# Patient Record
Sex: FEMALE | Race: WHITE | Employment: UNEMPLOYED | URBAN - METROPOLITAN AREA
[De-identification: names, ages, dates, MRNs, and addresses within clinical notes are randomized per-mention and may not be internally consistent; named-entity substitution may affect disease eponyms.]

---

## 2017-11-13 PROBLEM — M72.2 PLANTAR FASCIITIS: Status: ACTIVE | Noted: 2017-11-13

## 2018-04-03 ENCOUNTER — OFFICE VISIT (OUTPATIENT)
Dept: SURGERY | Facility: CLINIC | Age: 48
End: 2018-04-03

## 2018-04-03 VITALS
WEIGHT: 196 LBS | HEIGHT: 62 IN | SYSTOLIC BLOOD PRESSURE: 113 MMHG | BODY MASS INDEX: 36.07 KG/M2 | TEMPERATURE: 98 F | DIASTOLIC BLOOD PRESSURE: 78 MMHG | OXYGEN SATURATION: 98 % | HEART RATE: 98 BPM | RESPIRATION RATE: 18 BRPM

## 2018-04-03 DIAGNOSIS — D24.2 BREAST FIBROADENOMA IN FEMALE, LEFT: Primary | ICD-10-CM

## 2018-04-03 PROCEDURE — 99245 OFF/OP CONSLTJ NEW/EST HI 55: CPT | Performed by: SURGERY

## 2018-04-03 RX ORDER — SERTRALINE HYDROCHLORIDE 25 MG/1
TABLET, FILM COATED ORAL
Refills: 3 | COMMUNITY
Start: 2018-02-21 | End: 2018-04-03 | Stop reason: DRUGHIGH

## 2018-04-03 NOTE — PROGRESS NOTES
Education Record    Learner:  Patient    Disease / Diagnosis:surgical instructions    Barriers / Limitations:  None   Comments:    Method:  Discussion and Printed material   Comments:    General Topics:  Procedure and Plan of care reviewed   Comments:    O

## 2018-04-05 ENCOUNTER — TELEPHONE (OUTPATIENT)
Dept: SURGERY | Facility: CLINIC | Age: 48
End: 2018-04-05

## 2018-04-05 NOTE — PROGRESS NOTES
Breast Surgery New Patient Consultation    This is the first visit for this 49year old woman, referred by Mehreen Brush, who presents for evaluation of left breast mass.     History of Present Illness:   Ms. Terrell Hernandez is a 49year old woman who pre here today for evaluation and recommendations for further therapy.         Past Medical History:   Diagnosis Date   • Depression    • PULMONARY EMBOLISM    • Seizures ([facility] [address] 2009   • STROKE        Past Surgical History:  No date:   2008: OTHER exertion, emphysema, chronic bronchitis, shortness of breath or abnormal sound when breathing. Cardiovascular:   There is no history of chest pain, chest pressure/discomfort, palpitations, irregular heartbeat, fainting or near-fainting, difficulty breat Left arm, Patient Position: Sitting, Cuff Size: adult)   Pulse 98   Temp 98.1 °F (36.7 °C) (Tympanic)   Resp 18   Ht 1.575 m (5' 2\")   Wt 88.9 kg (196 lb)   SpO2 98%   BMI 35.85 kg/m²     Physical Exam:  The patient is an alert, oriented, well-nourished a history of breast cancer and an enlarging left breast mass. Discussion and Plan:  I had a discussion with the Patient regarding her breast exam. On exam today I found no palpable masses or clinical evidence of malignancy bilaterally.   I personally revsushil

## 2018-04-09 ENCOUNTER — TELEPHONE (OUTPATIENT)
Dept: SURGERY | Facility: CLINIC | Age: 48
End: 2018-04-09

## 2018-04-09 NOTE — TELEPHONE ENCOUNTER
Pt returning my phone call to schedule surgery. Pt asked for a date at the end of the month at either location. Scheduling surgery at [facility] on 4/30.

## 2018-04-24 RX ORDER — LORATADINE 10 MG/1
10 TABLET ORAL DAILY
COMMUNITY
End: 2019-04-09 | Stop reason: ALTCHOICE

## 2018-04-24 RX ORDER — IBUPROFEN 200 MG
200 TABLET ORAL EVERY 6 HOURS PRN
COMMUNITY
End: 2021-06-29 | Stop reason: ALTCHOICE

## 2018-04-24 RX ORDER — PHENYTOIN SODIUM 100 MG/1
200 CAPSULE, EXTENDED RELEASE ORAL DAILY
COMMUNITY
End: 2018-05-07

## 2018-04-24 RX ORDER — PHENYTOIN SODIUM 100 MG/1
300 CAPSULE, EXTENDED RELEASE ORAL NIGHTLY
COMMUNITY
End: 2018-05-07

## 2018-04-30 ENCOUNTER — ANESTHESIA (OUTPATIENT)
Dept: SURGERY | Facility: HOSPITAL | Age: 48
End: 2018-04-30
Payer: MEDICARE

## 2018-04-30 ENCOUNTER — APPOINTMENT (OUTPATIENT)
Dept: MAMMOGRAPHY | Facility: HOSPITAL | Age: 48
End: 2018-04-30
Attending: SURGERY
Payer: MEDICARE

## 2018-04-30 ENCOUNTER — HOSPITAL ENCOUNTER (OUTPATIENT)
Facility: HOSPITAL | Age: 48
Setting detail: HOSPITAL OUTPATIENT SURGERY
Discharge: HOME | End: 2018-04-30
Attending: SURGERY | Admitting: SURGERY
Payer: MEDICARE

## 2018-04-30 ENCOUNTER — HOSPITAL ENCOUNTER (OUTPATIENT)
Dept: MAMMOGRAPHY | Facility: HOSPITAL | Age: 48
Discharge: HOME | End: 2018-04-30
Attending: SURGERY
Payer: COMMERCIAL

## 2018-04-30 ENCOUNTER — HOSPITAL ENCOUNTER (OUTPATIENT)
Dept: ULTRASOUND IMAGING | Facility: HOSPITAL | Age: 48
Discharge: HOME | End: 2018-04-30
Attending: SURGERY
Payer: COMMERCIAL

## 2018-04-30 ENCOUNTER — ANESTHESIA EVENT (OUTPATIENT)
Dept: SURGERY | Facility: HOSPITAL | Age: 48
End: 2018-04-30
Payer: MEDICARE

## 2018-04-30 ENCOUNTER — SURGERY (OUTPATIENT)
Age: 48
End: 2018-04-30

## 2018-04-30 VITALS — HEART RATE: 87 BPM | RESPIRATION RATE: 16 BRPM | DIASTOLIC BLOOD PRESSURE: 87 MMHG | SYSTOLIC BLOOD PRESSURE: 123 MMHG

## 2018-04-30 VITALS
BODY MASS INDEX: 37.01 KG/M2 | TEMPERATURE: 98 F | DIASTOLIC BLOOD PRESSURE: 60 MMHG | OXYGEN SATURATION: 100 % | SYSTOLIC BLOOD PRESSURE: 95 MMHG | RESPIRATION RATE: 18 BRPM | HEIGHT: 62 IN | WEIGHT: 201.13 LBS | HEART RATE: 67 BPM

## 2018-04-30 DIAGNOSIS — D24.2 BREAST FIBROADENOMA, LEFT: ICD-10-CM

## 2018-04-30 PROCEDURE — 19285 PERQ DEV BREAST 1ST US IMAG: CPT | Performed by: SURGERY

## 2018-04-30 PROCEDURE — 76098 X-RAY EXAM SURGICAL SPECIMEN: CPT | Performed by: SURGERY

## 2018-04-30 PROCEDURE — 88307 TISSUE EXAM BY PATHOLOGIST: CPT | Performed by: SURGERY

## 2018-04-30 PROCEDURE — 0HBU0ZX EXCISION OF LEFT BREAST, OPEN APPROACH, DIAGNOSTIC: ICD-10-PCS | Performed by: SURGERY

## 2018-04-30 PROCEDURE — 77065 DX MAMMO INCL CAD UNI: CPT | Performed by: SURGERY

## 2018-04-30 PROCEDURE — BH01ZZZ PLAIN RADIOGRAPHY OF LEFT BREAST: ICD-10-PCS | Performed by: SURGERY

## 2018-04-30 PROCEDURE — 81025 URINE PREGNANCY TEST: CPT

## 2018-04-30 RX ORDER — METOCLOPRAMIDE 10 MG/1
10 TABLET ORAL ONCE
Status: DISCONTINUED | OUTPATIENT
Start: 2018-04-30 | End: 2018-04-30 | Stop reason: HOSPADM

## 2018-04-30 RX ORDER — HYDROCODONE BITARTRATE AND ACETAMINOPHEN 5; 325 MG/1; MG/1
2 TABLET ORAL AS NEEDED
Status: DISCONTINUED | OUTPATIENT
Start: 2018-04-30 | End: 2018-04-30

## 2018-04-30 RX ORDER — FAMOTIDINE 20 MG/1
20 TABLET ORAL ONCE
Status: COMPLETED | OUTPATIENT
Start: 2018-04-30 | End: 2018-04-30

## 2018-04-30 RX ORDER — HYDROMORPHONE HYDROCHLORIDE 1 MG/ML
0.2 INJECTION, SOLUTION INTRAMUSCULAR; INTRAVENOUS; SUBCUTANEOUS EVERY 5 MIN PRN
Status: DISCONTINUED | OUTPATIENT
Start: 2018-04-30 | End: 2018-04-30

## 2018-04-30 RX ORDER — HYDROCODONE BITARTRATE AND ACETAMINOPHEN 5; 325 MG/1; MG/1
1 TABLET ORAL AS NEEDED
Status: DISCONTINUED | OUTPATIENT
Start: 2018-04-30 | End: 2018-04-30

## 2018-04-30 RX ORDER — HYDROCODONE BITARTRATE AND ACETAMINOPHEN 5; 325 MG/1; MG/1
1-2 TABLET ORAL EVERY 6 HOURS PRN
Qty: 25 TABLET | Refills: 0 | Status: SHIPPED | OUTPATIENT
Start: 2018-04-30 | End: 2019-04-09 | Stop reason: ALTCHOICE

## 2018-04-30 RX ORDER — HYDROMORPHONE HYDROCHLORIDE 1 MG/ML
0.4 INJECTION, SOLUTION INTRAMUSCULAR; INTRAVENOUS; SUBCUTANEOUS EVERY 5 MIN PRN
Status: DISCONTINUED | OUTPATIENT
Start: 2018-04-30 | End: 2018-04-30

## 2018-04-30 RX ORDER — MIDAZOLAM HYDROCHLORIDE 1 MG/ML
INJECTION INTRAMUSCULAR; INTRAVENOUS AS NEEDED
Status: DISCONTINUED | OUTPATIENT
Start: 2018-04-30 | End: 2018-04-30 | Stop reason: SURG

## 2018-04-30 RX ORDER — HYDROMORPHONE HYDROCHLORIDE 1 MG/ML
0.6 INJECTION, SOLUTION INTRAMUSCULAR; INTRAVENOUS; SUBCUTANEOUS EVERY 5 MIN PRN
Status: DISCONTINUED | OUTPATIENT
Start: 2018-04-30 | End: 2018-04-30

## 2018-04-30 RX ORDER — ACETAMINOPHEN 500 MG
1000 TABLET ORAL ONCE
Status: COMPLETED | OUTPATIENT
Start: 2018-04-30 | End: 2018-04-30

## 2018-04-30 RX ORDER — NALOXONE HYDROCHLORIDE 0.4 MG/ML
80 INJECTION, SOLUTION INTRAMUSCULAR; INTRAVENOUS; SUBCUTANEOUS AS NEEDED
Status: DISCONTINUED | OUTPATIENT
Start: 2018-04-30 | End: 2018-04-30

## 2018-04-30 RX ORDER — CEFAZOLIN SODIUM 1 G/3ML
INJECTION, POWDER, FOR SOLUTION INTRAMUSCULAR; INTRAVENOUS AS NEEDED
Status: DISCONTINUED | OUTPATIENT
Start: 2018-04-30 | End: 2018-04-30 | Stop reason: SURG

## 2018-04-30 RX ORDER — SODIUM CHLORIDE, SODIUM LACTATE, POTASSIUM CHLORIDE, CALCIUM CHLORIDE 600; 310; 30; 20 MG/100ML; MG/100ML; MG/100ML; MG/100ML
INJECTION, SOLUTION INTRAVENOUS CONTINUOUS
Status: DISCONTINUED | OUTPATIENT
Start: 2018-04-30 | End: 2018-04-30

## 2018-04-30 RX ORDER — LIDOCAINE HYDROCHLORIDE 10 MG/ML
INJECTION, SOLUTION EPIDURAL; INFILTRATION; INTRACAUDAL; PERINEURAL AS NEEDED
Status: DISCONTINUED | OUTPATIENT
Start: 2018-04-30 | End: 2018-04-30 | Stop reason: SURG

## 2018-04-30 RX ORDER — ONDANSETRON 2 MG/ML
4 INJECTION INTRAMUSCULAR; INTRAVENOUS ONCE AS NEEDED
Status: DISCONTINUED | OUTPATIENT
Start: 2018-04-30 | End: 2018-04-30

## 2018-04-30 RX ADMIN — CEFAZOLIN SODIUM 1 G: 1 INJECTION, POWDER, FOR SOLUTION INTRAMUSCULAR; INTRAVENOUS at 10:32:00

## 2018-04-30 RX ADMIN — LIDOCAINE HYDROCHLORIDE 25 MG: 10 INJECTION, SOLUTION EPIDURAL; INFILTRATION; INTRACAUDAL; PERINEURAL at 10:34:00

## 2018-04-30 RX ADMIN — SODIUM CHLORIDE, SODIUM LACTATE, POTASSIUM CHLORIDE, CALCIUM CHLORIDE: 600; 310; 30; 20 INJECTION, SOLUTION INTRAVENOUS at 11:04:00

## 2018-04-30 RX ADMIN — SODIUM CHLORIDE, SODIUM LACTATE, POTASSIUM CHLORIDE, CALCIUM CHLORIDE: 600; 310; 30; 20 INJECTION, SOLUTION INTRAVENOUS at 10:28:00

## 2018-04-30 RX ADMIN — MIDAZOLAM HYDROCHLORIDE 2 MG: 1 INJECTION INTRAMUSCULAR; INTRAVENOUS at 10:28:00

## 2018-04-30 NOTE — PROCEDURES
[facility] HOSP - [facility]  Procedure Note    Abida Forts Patient Status:  Outpatient    1970 MRN [id number]   Location [place] [address] Attending Kiki Toth MD   Hosp Day # 0 PCP TARUN LOTT     Procedure: ultrasound g

## 2018-04-30 NOTE — ANESTHESIA POSTPROCEDURE EVALUATION
Patient: Carlos Subhash    Procedure Summary     Date:  04/30/18 Room / Location:  [facility] OR 08 / [facility] OR    Anesthesia Start:  8301 Anesthesia Stop:      Procedure:  BREAST BIOPSY NEEDLE LOCALIZATION (Left ) Diagnosis:  (left breast fibroadenoma)

## 2018-04-30 NOTE — IMAGING NOTE
PT TO US DEPT SCOUTS COMPLETED  AT 0830    HX TAKEN PROCEDURE EXPLAINED ORDERED VERFIED AND INITIALED BY ALL STAFF MEMBERS CONSENT OBTAINED AT 0815      [facility] HERE SCANNING COMPLETED     LEFT BREAST 1:00/9CFN SITE MARKED [phone] CHLOROPREP  AS Jeremy

## 2018-04-30 NOTE — OPERATIVE REPORT
[facility]    PATIENT'S NAME: Ramon Milton   ATTENDING PHYSICIAN: Thania Cordoba. Nicolás Fox MD   OPERATING PHYSICIAN: Thania Cordoba.  Nicolás Fox MD   PATIENT ACCOUNT#:   [id number]    LOCATION:  [facility] 10  MEDICAL RECORD #:   [id number] left breast was prepped and draped in usual sterile fashion. Lidocaine  2% with epinephrine was used to infiltrate the skin and subcutaneous tissues at the incision site. A radial axillary incision was made with a 15 blade knife in the skin.   The wire wa

## 2018-04-30 NOTE — H&P
History of Present Illness:   Ms. Zain Sánchez is a 49year old woman who presents with imaging detected bilateral breast concerns.   The patient has a personal history of a right breast needle biopsy followed by excisional biopsy in 2013 for what was [facility]) 2009   • STROKE           Past Surgical History:  No date:   2008: OTHER      Comment: IVC filter     Gynecological History:  Pt is a   Pt was 44years old at time of first pregnancy.     She has cumulative breastfeeding history of brief of chest pain, chest pressure/discomfort, palpitations, irregular heartbeat, fainting or near-fainting, difficulty breathing when lying flat, SOB/Coughing at night, swelling of the legs or chest pain while walking.     Breasts:  See history of present illn m (5' 2\")   Wt 88.9 kg (196 lb)   SpO2 98%   BMI 35.85 kg/m²      Physical Exam:  The patient is an alert, oriented, well-nourished and  well-developed woman who appears her stated age.  Her speech patterns and movements are normal. Her affect is appropria Patient regarding her breast exam. On exam today I found no palpable masses or clinical evidence of malignancy bilaterally.   I personally reviewed her recent imaging and pathology we discussed this at length.     We did discuss that fibroadenomas are not t planned.

## 2018-04-30 NOTE — BRIEF OP NOTE
Pre-Operative Diagnosis: left breast fibroadenoma     Post-Operative Diagnosis: left breast fibroadenoma      Procedure Performed:   Procedure(s):  left breast wire localized excisional biopsy    Surgeon(s) and Role:     Randal Talavera MD - Primary

## 2018-04-30 NOTE — ANESTHESIA PREPROCEDURE EVALUATION
Anesthesia PreOp Note    HPI:     Darleen Stafford is a 49year old female who presents for preoperative consultation requested by: Kadeem Mahoney MD    Date of Surgery: 4/30/2018    Procedure(s):  BREAST BIOPSY NEEDLE LOCALIZATION  Indication: left b Social History  Social History   Marital status:   Spouse name: N/A    Years of education: N/A  Number of children: N/A     Occupational History  None on file     Social History Main Topics   Smoking status: Former Smoker  For 2.00 Years     Q RITESH  4/30/2018 10:16 AM

## 2018-05-07 RX ORDER — PHENYTOIN SODIUM 100 MG/1
CAPSULE, EXTENDED RELEASE ORAL
COMMUNITY
Start: 2018-04-05 | End: 2021-06-30

## 2018-05-08 ENCOUNTER — OFFICE VISIT (OUTPATIENT)
Dept: SURGERY | Facility: CLINIC | Age: 48
End: 2018-05-08

## 2018-05-08 VITALS
TEMPERATURE: 99 F | HEART RATE: 85 BPM | SYSTOLIC BLOOD PRESSURE: 119 MMHG | RESPIRATION RATE: 20 BRPM | WEIGHT: 199.63 LBS | BODY MASS INDEX: 37 KG/M2 | DIASTOLIC BLOOD PRESSURE: 77 MMHG

## 2018-05-08 DIAGNOSIS — N63.20 LEFT BREAST MASS: ICD-10-CM

## 2018-05-08 DIAGNOSIS — R92.8 ABNORMAL MAMMOGRAM OF LEFT BREAST: ICD-10-CM

## 2018-05-08 DIAGNOSIS — D24.2 BREAST FIBROADENOMA IN FEMALE, LEFT: Primary | ICD-10-CM

## 2018-05-08 PROCEDURE — 99024 POSTOP FOLLOW-UP VISIT: CPT | Performed by: SURGERY

## 2018-05-08 NOTE — PROGRESS NOTES
Feeling well after her procedure. Some skin irritation, possibly related to adhesive. Steri strips in place. No signs of infection.

## 2018-05-09 NOTE — PROGRESS NOTES
Breast Surgery Post-Operative Visit    Diagnosis: Left breast fibroadenoma status post surgical excision on April 30, 2018. Stage: N/A    Disease Status:  Surgical treatment complete, no further treatment pending. History:    This 49year old woman pr erythema or drainage from the incision. She is here today for evaluation and recommendations for further therapy.         Past Medical History:   Diagnosis Date   • Deep vein thrombosis ([address]    • Depression    • PULMONARY EMBOLISM    • Seizures ([facility] [address] 2009 pleurisy/chest pain, pneumonia, asthma, wheezing, difficulty in breathing with exertion, emphysema, chronic bronchitis, shortness of breath or abnormal sound when breathing. Cardiovascular:   There is no history of chest pain, chest pressure/discomfort, history of hives, hay fever, angioedema or anaphylaxis.     /77 (BP Location: Left arm, Patient Position: Sitting)   Pulse 85   Temp 98.6 °F (37 °C) (Tympanic)   Resp 20   Wt 90.5 kg (199 lb 9.6 oz)   LMP 04/18/2018   BMI 36.51 kg/m²     Physical Exam

## 2019-04-09 PROBLEM — R58 RETROPERITONEAL BLEED: Status: RESOLVED | Noted: 2018-09-27 | Resolved: 2019-04-09

## 2019-04-09 PROBLEM — I82.220 IVC THROMBOSIS (HCC): Status: RESOLVED | Noted: 2018-09-27 | Resolved: 2019-04-09

## 2019-04-09 PROBLEM — I69.398 SEIZURE AS LATE EFFECT OF CEREBROVASCULAR ACCIDENT (CVA) (HCC): Status: ACTIVE | Noted: 2019-04-09

## 2019-04-09 PROBLEM — I82.220 IVC THROMBOSIS (HCC): Status: ACTIVE | Noted: 2018-09-27

## 2019-04-09 PROBLEM — R58 RETROPERITONEAL BLEED: Status: ACTIVE | Noted: 2018-09-27

## 2019-04-09 PROBLEM — I63.50 CEREBRAL ARTERY OCCLUSION WITH CEREBRAL INFARCTION (HCC): Status: ACTIVE | Noted: 2019-04-09

## 2019-04-09 PROBLEM — R56.9 SEIZURE AS LATE EFFECT OF CEREBROVASCULAR ACCIDENT (CVA) (HCC): Status: ACTIVE | Noted: 2019-04-09

## 2019-04-09 PROBLEM — I80.201 DEEP VEIN THROMBOPHLEBITIS OF LEG, RIGHT (HCC): Status: ACTIVE | Noted: 2018-06-03

## 2019-04-09 PROBLEM — D68.52 PROTHROMBIN GENE MUTATION (HCC): Status: ACTIVE | Noted: 2019-04-09

## 2019-04-09 PROBLEM — I80.201 DEEP VEIN THROMBOPHLEBITIS OF LEG, RIGHT (HCC): Status: RESOLVED | Noted: 2018-06-03 | Resolved: 2019-04-09

## 2019-04-09 PROBLEM — I63.50 CEREBRAL ARTERY OCCLUSION WITH CEREBRAL INFARCTION (HCC): Status: RESOLVED | Noted: 2019-04-09 | Resolved: 2019-04-09

## 2019-04-09 PROBLEM — D68.59 PRIMARY HYPERCOAGULABLE STATE (HCC): Status: ACTIVE | Noted: 2019-04-09

## 2019-05-06 PROBLEM — I82.409 DVT (DEEP VENOUS THROMBOSIS) (HCC): Status: ACTIVE | Noted: 2019-05-06

## 2019-12-03 PROBLEM — I82.502: Status: ACTIVE | Noted: 2019-12-03

## 2019-12-03 PROBLEM — Z79.01 MONITORING FOR ANTICOAGULANT USE: Status: ACTIVE | Noted: 2019-12-03

## 2019-12-03 PROBLEM — Z51.81 MONITORING FOR ANTICOAGULANT USE: Status: ACTIVE | Noted: 2019-12-03

## 2020-09-21 ENCOUNTER — OFF PREMISE (OUTPATIENT)
Dept: PULMONOLOGY | Age: 50
End: 2020-09-21

## 2020-09-21 DIAGNOSIS — G47.33 OBSTRUCTIVE SLEEP APNEA (ADULT) (PEDIATRIC): Primary | ICD-10-CM

## 2020-09-21 PROCEDURE — 95810 POLYSOM 6/> YRS 4/> PARAM: CPT | Performed by: INTERNAL MEDICINE

## 2020-10-13 ENCOUNTER — OFF PREMISE (OUTPATIENT)
Dept: PULMONOLOGY | Age: 50
End: 2020-10-13

## 2020-10-13 DIAGNOSIS — G47.33 OBSTRUCTIVE SLEEP APNEA (ADULT) (PEDIATRIC): Primary | ICD-10-CM

## 2020-10-13 PROCEDURE — 95811 POLYSOM 6/>YRS CPAP 4/> PARM: CPT | Performed by: INTERNAL MEDICINE

## 2021-03-23 ENCOUNTER — APPOINTMENT (OUTPATIENT)
Dept: PULMONOLOGY | Age: 51
End: 2021-03-23

## 2021-06-29 PROBLEM — Z86.72 HISTORY OF DEEP VEIN THROMBOPHLEBITIS OF LOWER EXTREMITY: Status: ACTIVE | Noted: 2019-12-03

## 2021-06-29 PROBLEM — G47.33 OBSTRUCTIVE SLEEP APNEA SYNDROME: Status: ACTIVE | Noted: 2020-10-13

## 2021-06-29 PROBLEM — Z51.81 MONITORING FOR ANTICOAGULANT USE: Status: RESOLVED | Noted: 2019-12-03 | Resolved: 2021-06-29

## 2021-06-29 PROBLEM — I82.409 DVT (DEEP VENOUS THROMBOSIS) (HCC): Status: RESOLVED | Noted: 2019-05-06 | Resolved: 2021-06-29

## 2021-06-29 PROBLEM — Z79.01 MONITORING FOR ANTICOAGULANT USE: Status: RESOLVED | Noted: 2019-12-03 | Resolved: 2021-06-29

## 2021-06-29 PROBLEM — G81.94 LEFT HEMIPARESIS (HCC): Status: ACTIVE | Noted: 2021-05-25

## 2021-07-27 ENCOUNTER — HOSPITAL ENCOUNTER (EMERGENCY)
Age: 51
Discharge: HOME | End: 2021-07-27
Payer: MEDICARE

## 2021-07-27 ENCOUNTER — APPOINTMENT (OUTPATIENT)
Dept: ULTRASOUND IMAGING | Age: 51
End: 2021-07-27
Payer: MEDICARE

## 2021-07-27 ENCOUNTER — HOSPITAL ENCOUNTER (OUTPATIENT)
Age: 51
Discharge: ANOTHER HOSPITAL | End: 2021-07-27
Attending: EMERGENCY MEDICINE
Payer: MEDICARE

## 2021-07-27 VITALS
RESPIRATION RATE: 16 BRPM | SYSTOLIC BLOOD PRESSURE: 107 MMHG | DIASTOLIC BLOOD PRESSURE: 56 MMHG | TEMPERATURE: 98 F | HEIGHT: 62 IN | WEIGHT: 230 LBS | OXYGEN SATURATION: 98 % | HEART RATE: 72 BPM | BODY MASS INDEX: 42.33 KG/M2

## 2021-07-27 VITALS
HEART RATE: 82 BPM | DIASTOLIC BLOOD PRESSURE: 79 MMHG | RESPIRATION RATE: 20 BRPM | SYSTOLIC BLOOD PRESSURE: 137 MMHG | OXYGEN SATURATION: 98 % | TEMPERATURE: 98 F

## 2021-07-27 DIAGNOSIS — M79.602 LEFT ARM PAIN: Primary | ICD-10-CM

## 2021-07-27 DIAGNOSIS — M79.89 LEFT ARM SWELLING: Primary | ICD-10-CM

## 2021-07-27 PROCEDURE — 99284 EMERGENCY DEPT VISIT MOD MDM: CPT

## 2021-07-27 PROCEDURE — 93971 EXTREMITY STUDY: CPT

## 2021-07-27 PROCEDURE — 99212 OFFICE O/P EST SF 10 MIN: CPT

## 2021-07-28 NOTE — ED PROVIDER NOTES
[facility] [place]      History   Patient presents with:  Swelling Edema    Stated Complaint: sent from [place] to r/o DVT in l arm due to numbness    HPI/Subjective:   HPI    42-year-old female with history of This is a middle-aged female sitting on a gurney. Vital signs were reviewed per nurse's notes. Left upper extremity: Neurovascularly intact with full range of motion.   There is mild diffuse left upper arm soft tissue swelling without erythema or indurati [place] [address]  [phone]    Call  As needed          Medications Prescribed:  Discharge Medication List as of 7/27/2021 10:10 PM

## 2021-07-28 NOTE — ED QUICK NOTES
Pt c/o laid down on left side woke up feeling numb on the left arm laid on right and felt a lump on left arm. Pain on the left forearm. Pt states whole body aching.

## 2021-07-28 NOTE — ED INITIAL ASSESSMENT (HPI)
Noticed bump to l upper arm this afternoon - had noticed tingling to arm today-- hx of DVTS--  Denies SOB

## 2021-07-28 NOTE — ED PROVIDER NOTES
Patient Seen in: [facility]      History   Patient presents with:  Pain    Stated Complaint: left arm numb all day pain in wrist and noticed bump on arm/states has history *    HPI/Subjective:   HPI    Patient is a 16-year-old female with Constitutional:       General: She is not in acute distress. Appearance: She is well-developed. She is not toxic-appearing. HENT:      Head: Normocephalic and atraumatic. Eyes:      General: No scleral icterus.      Conjunctiva/sclera: Conjunctiva

## 2021-07-28 NOTE — ED INITIAL ASSESSMENT (HPI)
Pt here w/ c/o numbness to left arm onset at noon today. States she noticed a lump on left arm today.

## 2023-06-12 ENCOUNTER — NURSE NAVIGATOR ENCOUNTER (OUTPATIENT)
Dept: HEMATOLOGY/ONCOLOGY | Facility: HOSPITAL | Age: 53
End: 2023-06-12

## 2023-06-12 NOTE — PROGRESS NOTES
Patient called back in regards to the VM. We discussed newly diagnosed breast cancer. Introduced myself as one of the breast nurse navigators and explained the role of the breast nurse navigator. Explained the role of the physicians on her breast cancer care team. Reviewed over pathology report and discussed the type of breast cancer, grade, and ER/DC and Her 2 is pending. Briefly discussed breast cancer treatments including surgery, radiation, hormone therapy, and chemotherapy. I stated that since her cancer is also in her lymph node, I offered an appointment with Dr. Fanta iKm on Tuesday June 20, 2023 at 1500 at the [facility] and she accepted. Explained the breast cancer multidisciplinary meeting and that her case will be discussed. Scheduled patient's Dr. Zunilda De appointment on Tuesday June 20, 2023 at 0900 in [place]. Patient is scheduled for a breast MRI at [facility] tomorrow, Tuesday June 13, 2023. Patient instructed patient to bring 5 years of her breast images and records to Dr. Meka Alejandre office. She verbalized understanding. Patient given my contact information to call with any further questions or concerns.

## 2023-06-12 NOTE — PROGRESS NOTES
LMOVMTCB in regards to introducing myself as one of [address] [place]'s breast nurse navigators and that I work with Dr. Meg Humphrey. Awaiting phone call back from patient.

## 2023-06-20 ENCOUNTER — OFFICE VISIT (OUTPATIENT)
Dept: SURGERY | Facility: CLINIC | Age: 53
End: 2023-06-20
Payer: MEDICARE

## 2023-06-20 ENCOUNTER — NURSE NAVIGATOR ENCOUNTER (OUTPATIENT)
Dept: HEMATOLOGY/ONCOLOGY | Facility: HOSPITAL | Age: 53
End: 2023-06-20

## 2023-06-20 ENCOUNTER — OFFICE VISIT (OUTPATIENT)
Dept: HEMATOLOGY/ONCOLOGY | Facility: HOSPITAL | Age: 53
End: 2023-06-20
Attending: INTERNAL MEDICINE
Payer: MEDICARE

## 2023-06-20 VITALS
OXYGEN SATURATION: 97 % | WEIGHT: 209 LBS | HEART RATE: 76 BPM | HEIGHT: 62.01 IN | TEMPERATURE: 98 F | RESPIRATION RATE: 16 BRPM | BODY MASS INDEX: 37.97 KG/M2 | DIASTOLIC BLOOD PRESSURE: 76 MMHG | SYSTOLIC BLOOD PRESSURE: 123 MMHG

## 2023-06-20 VITALS
SYSTOLIC BLOOD PRESSURE: 122 MMHG | HEIGHT: 62 IN | RESPIRATION RATE: 16 BRPM | OXYGEN SATURATION: 97 % | BODY MASS INDEX: 39.34 KG/M2 | HEART RATE: 90 BPM | DIASTOLIC BLOOD PRESSURE: 79 MMHG | TEMPERATURE: 98 F | WEIGHT: 213.81 LBS

## 2023-06-20 DIAGNOSIS — D68.52 PROTHROMBIN GENE MUTATION (HCC): ICD-10-CM

## 2023-06-20 DIAGNOSIS — D68.59 PRIMARY HYPERCOAGULABLE STATE (HCC): ICD-10-CM

## 2023-06-20 DIAGNOSIS — C77.3 BREAST CANCER METASTASIZED TO AXILLARY LYMPH NODE, LEFT (HCC): ICD-10-CM

## 2023-06-20 DIAGNOSIS — C50.912 BREAST CANCER METASTASIZED TO AXILLARY LYMPH NODE, LEFT (HCC): ICD-10-CM

## 2023-06-20 DIAGNOSIS — C50.912 BREAST CANCER METASTASIZED TO AXILLARY LYMPH NODE, LEFT (HCC): Primary | ICD-10-CM

## 2023-06-20 DIAGNOSIS — Z86.718 HISTORY OF VENOUS THROMBOEMBOLISM: ICD-10-CM

## 2023-06-20 DIAGNOSIS — G81.94 LEFT HEMIPARESIS (HCC): ICD-10-CM

## 2023-06-20 DIAGNOSIS — C77.3 BREAST CANCER METASTASIZED TO AXILLARY LYMPH NODE, LEFT (HCC): Primary | ICD-10-CM

## 2023-06-20 DIAGNOSIS — Z17.0 MALIGNANT NEOPLASM OF LOWER-OUTER QUADRANT OF LEFT BREAST OF FEMALE, ESTROGEN RECEPTOR POSITIVE (HCC): Primary | ICD-10-CM

## 2023-06-20 DIAGNOSIS — Z86.73 HISTORY OF STROKE: ICD-10-CM

## 2023-06-20 DIAGNOSIS — Z79.01 CURRENT USE OF LONG TERM ANTICOAGULATION: ICD-10-CM

## 2023-06-20 DIAGNOSIS — C50.512 MALIGNANT NEOPLASM OF LOWER-OUTER QUADRANT OF LEFT BREAST OF FEMALE, ESTROGEN RECEPTOR POSITIVE (HCC): Primary | ICD-10-CM

## 2023-06-20 PROCEDURE — 99205 OFFICE O/P NEW HI 60 MIN: CPT | Performed by: INTERNAL MEDICINE

## 2023-06-20 PROCEDURE — 99205 OFFICE O/P NEW HI 60 MIN: CPT | Performed by: SURGERY

## 2023-06-20 NOTE — PROGRESS NOTES
Met with patient in clinic. Introduced myself as one the breast nurse navigators and explained the role of the breast nurse navigator and coordination of care. Explained the role of all of the physicians involved in her care including the surgeon, medical oncologist, radiation oncologist, and possibly plastic surgeon. Reviewed over the patients pathology report and discussed receptors including ER/NE/ and Her 2. Patient was given the breast cancer treatment handbook and reviewed over how to use this resource. Discussed the breast multidisciplinary conference and that her case will be discussed. Patient was given the contact information for the social workers at the [facility] and resources for support as requested. Breast cancer journey map provided to patient. Sent requested information to patient on local resources including support groups for her daughter. Also provided information on the [place] for Reassurance through [facility]. Next step in care will be to see medical oncology this afternoon. Has CT scan on 6/30 and bone scan on 6/30. Pt was provided with breast nurse navigator contact information and was encouraged to phone with any other questions or concerns. Date Of Previous Biopsy (Optional): 09 November 2021

## 2023-06-20 NOTE — PATIENT INSTRUCTIONS
Dr. Doyle Bragg  Tel: [phone]  Fax: [facility] [address] [place], [address]  [phone]     Surgery/Procedure: Left breast wire bracketed lumpectomy, left axillary lymph node dissection    FINAL SURGICAL PLAN AFTER MET WORKUP     Anesthesia:   Gen  Surgery Length:   1.5 hours CPT:     Wire LOC:   Yes         Nuc Med:   No Cathy Seed:  No       Dx & ICD-10:    Radiology Instructions:    _______________________________________________________________________________    Someone must accompany you the day of the procedure to drive you home safely, because of anesthesia. You will need an adult  to stay with you the first night following your surgery. You must remove any kind of makeup, acrylic nails, lotions, powders, creams or deodorant. [place] ONLY: Pre-admission will give instruct you on when to take Gatorade and Tylenol/acetaminophen prior to your surgery, purchase 2 - 12oz bottles of regular Gatorade (NOT RED/SUGAR FREE). Otherwise, you may not eat or drink anything else after 11PM the night before surgery. [place] ONLY: You may not eat or drink anything after midnight the day of your surgery. Wear comfortable clothing that can be easily removed. If you wear dentures, contacts lenses, or any prosthesis, you will be asked to remove them. Do not drink alcohol or smoke 24 hours prior to your procedure. Bring a picture ID and your insurance card. GENERAL ANESTHESIA ONLY: You will be contacted by the hospital for Pre-Admission Covid-19 testing (regardless of vaccination status) to be scheduled as an appointment prior to surgery. They will call closer to the surgery date to set this up, because the earliest this can be done is 72 hours prior to surgery. The Pre-Admission Testing Department will call the day before to confirm your procedure, give you the time you need to arrive by and directions on where to go.  They begin making calls after 2pm, if you are not contacted by 4pm, please call the surgeon's office listed above. Do not take any blood thinners at least one week prior to the procedure/surgery. This includes aspirin, baby aspirin, Ibuprofen products, herbal supplements, diet medications, vitamin E, fish oil and green tea supplements. Please check other supplements for these ingredients. *TYLENOL or acetaminophen is acceptable*  If you take Coumadin, Plavix, Xarelto, or Eliquis, please contact your prescribing physician for special instructions on how long to hold. If you take insulin contact your primary care physician for special instructions. Our surgery scheduler, Guy Griffin, will be contacting you to discuss surgery dates. If you have any questions related to scheduling your surgery, please reach out to her at [phone].  _____________________________________________________________________  PRE-OPERATIVE TESTING IF INDICATED BELOW  PLEASE COMPLETE ASAP (AT LEAST 14-21 DAYS PRIOR TO SURGERY)  [] CBC [x] BMP [] CMP [x] EKG    [] PT, PTT, INR [x] Cardiac Clearance  [] H&P Medical Clearance [] Chest X-ray     Please call Central Scheduling to schedule an appointment for pre-operative labs/tests @ ([phone]    Does the patient have a pacemaker or ICD?      [] Yes   [x] No

## 2023-06-21 ENCOUNTER — NURSE NAVIGATOR ENCOUNTER (OUTPATIENT)
Dept: HEMATOLOGY/ONCOLOGY | Facility: HOSPITAL | Age: 53
End: 2023-06-21

## 2023-06-23 ENCOUNTER — APPOINTMENT (OUTPATIENT)
Dept: HEMATOLOGY/ONCOLOGY | Facility: HOSPITAL | Age: 53
End: 2023-06-23
Attending: INTERNAL MEDICINE

## 2023-06-23 ENCOUNTER — HOSPITAL ENCOUNTER (OUTPATIENT)
Dept: CT IMAGING | Facility: HOSPITAL | Age: 53
Discharge: HOME | End: 2023-06-23
Attending: INTERNAL MEDICINE
Payer: MEDICARE

## 2023-06-23 DIAGNOSIS — C50.912 BREAST CANCER METASTASIZED TO AXILLARY LYMPH NODE, LEFT (HCC): ICD-10-CM

## 2023-06-23 DIAGNOSIS — C77.3 BREAST CANCER METASTASIZED TO AXILLARY LYMPH NODE, LEFT (HCC): ICD-10-CM

## 2023-06-23 LAB
CREAT BLD-MCNC: 0.8 MG/DL
GFR SERPLBLD BASED ON 1.73 SQ M-ARVRAT: 88 ML/MIN/1.73M2 (ref 60–?)

## 2023-06-23 PROCEDURE — 74177 CT ABD & PELVIS W/CONTRAST: CPT | Performed by: INTERNAL MEDICINE

## 2023-06-23 PROCEDURE — 82565 ASSAY OF CREATININE: CPT

## 2023-06-23 PROCEDURE — 71260 CT THORAX DX C+: CPT | Performed by: INTERNAL MEDICINE

## 2023-06-30 ENCOUNTER — HOSPITAL ENCOUNTER (OUTPATIENT)
Dept: NUCLEAR MEDICINE | Facility: HOSPITAL | Age: 53
Discharge: HOME | End: 2023-06-30
Attending: INTERNAL MEDICINE
Payer: MEDICARE

## 2023-06-30 ENCOUNTER — NURSE NAVIGATOR ENCOUNTER (OUTPATIENT)
Dept: HEMATOLOGY/ONCOLOGY | Facility: HOSPITAL | Age: 53
End: 2023-06-30

## 2023-06-30 DIAGNOSIS — C77.3 BREAST CANCER METASTASIZED TO AXILLARY LYMPH NODE, LEFT (HCC): ICD-10-CM

## 2023-06-30 DIAGNOSIS — C50.912 BREAST CANCER METASTASIZED TO AXILLARY LYMPH NODE, LEFT (HCC): ICD-10-CM

## 2023-06-30 PROCEDURE — 78306 BONE IMAGING WHOLE BODY: CPT | Performed by: INTERNAL MEDICINE

## 2023-06-30 PROCEDURE — 78832 RP LOCLZJ TUM SPECT W/CT 2: CPT | Performed by: INTERNAL MEDICINE

## 2023-07-03 ENCOUNTER — LAB ENCOUNTER (OUTPATIENT)
Dept: LAB | Facility: HOSPITAL | Age: 53
End: 2023-07-03
Attending: SURGERY
Payer: MEDICARE

## 2023-07-03 DIAGNOSIS — C50.912 INVASIVE DUCTAL CARCINOMA OF BREAST, FEMALE, LEFT (HCC): Primary | ICD-10-CM

## 2023-07-03 PROCEDURE — 88321 CONSLTJ&REPRT SLD PREP ELSWR: CPT

## 2023-07-06 ENCOUNTER — NURSE NAVIGATOR ENCOUNTER (OUTPATIENT)
Dept: HEMATOLOGY/ONCOLOGY | Facility: HOSPITAL | Age: 53
End: 2023-07-06

## 2023-07-06 ENCOUNTER — PATIENT MESSAGE (OUTPATIENT)
Dept: HEMATOLOGY/ONCOLOGY | Facility: HOSPITAL | Age: 53
End: 2023-07-06

## 2023-07-06 DIAGNOSIS — C77.3 BREAST CANCER METASTASIZED TO AXILLARY LYMPH NODE, LEFT (HCC): Primary | ICD-10-CM

## 2023-07-06 DIAGNOSIS — C50.912 BREAST CANCER METASTASIZED TO AXILLARY LYMPH NODE, LEFT (HCC): Primary | ICD-10-CM

## 2023-07-06 NOTE — PROGRESS NOTES
Called patient in regards to notifying her that I sent her a [employer] message with a list of [phone] PCPs that her  asked for when I saw him yesterday when I provided the list of lab work, EKG, and cardiac clearance that was provided by Dr. Nicole Police team for surgery clearance from her PCP. She thanked me and stated she may be seeing her PCP on Friday July 7th now for an appointment and she is pleased. She thanked me for the phone call and assistance and was provided with the breast nurse navigators contact information and was encouraged to phone with any other questions or concerns.

## 2023-07-07 ENCOUNTER — OFFICE VISIT (OUTPATIENT)
Dept: SURGERY | Facility: CLINIC | Age: 53
End: 2023-07-07
Payer: MEDICARE

## 2023-07-07 VITALS
RESPIRATION RATE: 19 BRPM | HEART RATE: 75 BPM | WEIGHT: 212 LBS | DIASTOLIC BLOOD PRESSURE: 70 MMHG | OXYGEN SATURATION: 96 % | BODY MASS INDEX: 39 KG/M2 | SYSTOLIC BLOOD PRESSURE: 107 MMHG | TEMPERATURE: 98 F

## 2023-07-07 DIAGNOSIS — C77.3 BREAST CANCER METASTASIZED TO AXILLARY LYMPH NODE, LEFT (HCC): Primary | ICD-10-CM

## 2023-07-07 DIAGNOSIS — C50.912 BREAST CANCER METASTASIZED TO AXILLARY LYMPH NODE, LEFT (HCC): Primary | ICD-10-CM

## 2023-07-10 NOTE — TELEPHONE ENCOUNTER
Calling pt in regards to scheduling surgery. Informed pt that I have 07/31/2023 but wanted 08/07/2023 with  starting new job at [employer] with Dr. Lauran Opitz. Pt verbalized understanding and in agreement with date and location. All questions answered. Encouraged pt to call or [employer] message office with any other questions or concerns.

## 2023-07-12 ENCOUNTER — TELEPHONE (OUTPATIENT)
Dept: SURGERY | Facility: CLINIC | Age: 53
End: 2023-07-12

## 2023-07-13 ENCOUNTER — TELEPHONE (OUTPATIENT)
Dept: SURGERY | Facility: CLINIC | Age: 53
End: 2023-07-13

## 2023-07-13 NOTE — TELEPHONE ENCOUNTER
Calling patient to offer sooner surgical date. Pt needs to hold coumadin for 5 days prior to surgery, so unable to take sooner surgical date. Pt stated if any other sooner surgical dates are available to please call her.

## 2023-07-20 ENCOUNTER — TELEPHONE (OUTPATIENT)
Dept: SURGERY | Facility: CLINIC | Age: 53
End: 2023-07-20

## 2023-07-20 DIAGNOSIS — C50.912 BREAST CANCER METASTASIZED TO AXILLARY LYMPH NODE, LEFT (HCC): Primary | ICD-10-CM

## 2023-07-20 DIAGNOSIS — C77.3 BREAST CANCER METASTASIZED TO AXILLARY LYMPH NODE, LEFT (HCC): Primary | ICD-10-CM

## 2023-07-20 NOTE — TELEPHONE ENCOUNTER
Patient called and stated she is transferring care and is cancelling her surgery with Dr. Holden Zayas.   Sent in basket to [facility]

## 2024-07-19 ENCOUNTER — HOSPITAL ENCOUNTER (EMERGENCY)
Age: 54
Discharge: HOME | End: 2024-07-19
Attending: EMERGENCY MEDICINE
Payer: COMMERCIAL

## 2024-07-19 VITALS
BODY MASS INDEX: 42.33 KG/M2 | RESPIRATION RATE: 16 BRPM | TEMPERATURE: 98 F | HEIGHT: 62 IN | WEIGHT: 230 LBS | OXYGEN SATURATION: 95 % | SYSTOLIC BLOOD PRESSURE: 108 MMHG | HEART RATE: 77 BPM | DIASTOLIC BLOOD PRESSURE: 76 MMHG

## 2024-07-19 DIAGNOSIS — L03.313 CELLULITIS OF CHEST WALL: Primary | ICD-10-CM

## 2024-07-19 DIAGNOSIS — L58.9 POST-RADIATION DERMATITIS: ICD-10-CM

## 2024-07-19 PROCEDURE — 99283 EMERGENCY DEPT VISIT LOW MDM: CPT

## 2024-07-19 PROCEDURE — 99284 EMERGENCY DEPT VISIT MOD MDM: CPT

## 2024-07-19 RX ORDER — ANASTROZOLE 1 MG/1
1 TABLET ORAL DAILY
COMMUNITY

## 2024-07-19 RX ORDER — NYSTATIN 100000 U/G
CREAM TOPICAL 2 TIMES DAILY
COMMUNITY

## 2024-07-19 RX ORDER — DOXYCYCLINE HYCLATE 100 MG/1
100 CAPSULE ORAL 2 TIMES DAILY
Qty: 20 CAPSULE | Refills: 0 | Status: SHIPPED | OUTPATIENT
Start: 2024-07-19 | End: 2024-07-29

## 2024-07-19 RX ORDER — MINERAL OIL/HYDROPHIL PETROLAT
OINTMENT (GRAM) TOPICAL AS NEEDED
COMMUNITY

## 2024-07-19 RX ORDER — MELATONIN
1000 DAILY
COMMUNITY

## 2024-07-19 RX ORDER — MOMETASONE FUROATE 1 MG/G
CREAM TOPICAL DAILY
COMMUNITY

## 2024-07-19 RX ORDER — MICONAZOLE NITRATE 20 MG/ML
SOLUTION TOPICAL
COMMUNITY

## 2024-07-19 NOTE — ED INITIAL ASSESSMENT (HPI)
Pt reports coming in due to left breast redness and skin irritation. Pt reports she is getting radiation to her left breast due to breast cancer. Pt states she goes Monday-Friday for radiation and has been going for the last 5 weeks. Pt reports her last treatment was Monday and stopped going due to being diagnosed with covid. Pt states she also seen her dermatologist on Monday and they swabbed the area. Was given topical creams.

## 2024-07-19 NOTE — ED PROVIDER NOTES
Patient Seen in: [facility]      History     Chief Complaint   Patient presents with    Covid    Cellulitis     Stated Complaint: skin infection from radiation, covid + /    Subjective:   HPI    ***    Objective:   Past Medical History:    Cancer (HCC)    breast cancer    Deep vein thrombosis (HCC)    Depression    Fatigue due to old head trauma    secondary to hemorrhagic stroke    Foot drop, left    since stroke    TERESA (obstructive sleep apnea)    AHI 5.3, O2 rosy 86%, CPAP 9    PULMONARY EMBOLISM    Seizures (HCC)    STROKE    hemorrhagic stroke              Past Surgical History:   Procedure Laterality Date    Biopsy/removal, lymph node(s)      Breast biopsy            Other      IVC filter    Other  2018    ivc removal    Other Right 2018    dvt removed by surgery                Social History     Socioeconomic History    Marital status:    Tobacco Use    Smoking status: Former     Current packs/day: 0.00     Types: Cigarettes     Start date: 1986     Quit date: 1988     Years since quittin.2    Smokeless tobacco: Never   Vaping Use    Vaping status: Never Used   Substance and Sexual Activity    Alcohol use: No    Drug use: No    Sexual activity: Yes     Partners: Male     Birth control/protection: Condom, Vasectomy     Social Determinants of Health     Food Insecurity: Low Risk  (2023)    Received from [facility]    Food Insecurity     Have there been times that your food ran out, and you didn't have money to get more?: No     Are there times that you worry that this might happen?: No   Transportation Needs: Low Risk  (2023)    Received from [facility]    Transportation Needs     Do you have trouble getting transportation to medical appointments?: No    [facility]    Social Connections              Review of  Systems    Positive for stated Chief Complaint: Covid and Cellulitis    Other systems are as noted in HPI.  Constitutional and vital signs reviewed.      All other systems reviewed and negative except as noted above.    Physical Exam     ED Triage Vitals [07/19/24 1202]   /76   Pulse 77   Resp 16   Temp 98.1 °F (36.7 °C)   Temp src Oral   SpO2 95 %   O2 Device None (Room air)       Current Vitals:   Vital Signs  BP: 108/76  Pulse: 77  Resp: 16  Temp: 98.1 °F (36.7 °C)  Temp src: Oral    Oxygen Therapy  SpO2: 95 %  O2 Device: None (Room air)            Physical Exam    ***      ED Course   Labs Reviewed - No data to display          ***         MDM      ***                                   MDM    Disposition and Plan     Clinical Impression:  No diagnosis found.     Disposition:  There is no disposition on file for this visit.  There is no disposition time on file for this visit.    Follow-up:  No follow-up provider specified.        Medications Prescribed:  Current Discharge Medication List                                          Augmentin.  Discussed with patient to use Aquaphor and cold packs left breast.  Discussed with patient to speak with her hematology oncologist  About her left breast redness and is likely much of this is due to radiation.  Discussed with her follow-up with her hematology oncology radiation doctor, dermatologist and/or PCP to make sure this is improving.                               Medical Decision Making      Disposition and Plan     Clinical Impression:  1. Cellulitis of chest wall    2. Post-radiation dermatitis         Disposition:  Discharge  7/19/2024  1:05 pm    Follow-up:  your radiation oncologist    Follow up in 2 day(s)      your dermatologist    Follow up in 2 day(s)      Alejandra Weaver MD  [address] [place] [id number] [phone]    Follow up in 2 day(s)            Medications Prescribed:  Discharge Medication List as of 7/19/2024  1:14 PM        START taking these medications    Details   doxycycline 100 MG Oral Cap Take 1 capsule (100 mg total) by mouth 2 (two) times daily for 10 days., Normal, Disp-20 capsule, R-0

## (undated) DEVICE — CLIP SM INTNL HMCLP TNTLM ESCP

## (undated) DEVICE — DRAPE TAPE: Brand: CONVERTORS

## (undated) DEVICE — SUTURE VICRYL 3-0 SH

## (undated) DEVICE — SOL  .9 1000ML BTL

## (undated) DEVICE — Device: Brand: JELCO

## (undated) DEVICE — MINOR GENERAL: Brand: MEDLINE INDUSTRIES, INC.

## (undated) DEVICE — STERILE LATEX POWDER-FREE SURGICAL GLOVESWITH NITRILE COATING: Brand: PROTEXIS

## (undated) DEVICE — VEST SRG 3 MED CUP R/L

## (undated) DEVICE — FLEXIBLE YANKAUER,MEDIUM TIP, NO VACUUM CONTROL: Brand: ARGYLE

## (undated) DEVICE — SUTURE SILK 2-0 685H

## (undated) DEVICE — SUTURE MONOCRYL 4-0 PS-2

## (undated) DEVICE — CAUTERY BLADE 2IN INS E1455

## (undated) DEVICE — ADHESIVE MASTISOL 2/3CC VL

## (undated) DEVICE — CLIP MED INTNL HMCLP TNTLM

## (undated) DEVICE — DRAPE PACK CHEST & U BAR

## (undated) DEVICE — 12 ML SYRINGE LUER-LOCK TIP: Brand: MONOJECT

## (undated) NOTE — LETTER
[address] [place] [address] Rd, [place], [place]     AUTHORIZATION FOR SURGICAL OPERATION OR PROCEDURE    I hereby authorize Dr. Ellie aSenz MD, my Physician(s) and whomever may be designated as the doctor's Assistant, to perform the f procedure(s) to be performed for the purposes of advancing medicine, science and/or education, provided my identity is not revealed. If the procedure has been videotaped, the physician/surgeon will obtain the original videotape.  The hospital will not be re (Date)                                (Time)  STATEMENT OF PHYSICIAN My signature below affirms that prior to the time of the procedure; I have explained to the patient and/or his/her legal representative, t

## (undated) NOTE — LETTER
[address] [place] [address] Rd, [place], [place]     AUTHORIZATION FOR SURGICAL OPERATION OR PROCEDURE    I hereby authorize                                      , my Physician(s) and whomever may be designated as the doctor's Assistant, to Rebecca 4. I consent to the photographing of procedure(s) to be performed for the purposes of advancing medicine, science and/or education, provided my identity is not revealed.  If the procedure has been videotaped, the physician/surgeon will obtain the original v (Witness signature)                                                                                                  (Date)                                (Time)  STATEMENT OF PHYSICIAN My signature below affirms that prior to the time of the procedure;  I

## (undated) NOTE — LETTER
[facility]  Administration of Anesthesia  1. Casey Crawford, or _________________________________ acting on her behalf, (Patient) (Dependent/Representative) request to receive anesthesia for my pending procedure/operation/treatment. bleeding, seizure, cardiac arrest and death. 7. AWARENESS: I understand that it is possible (but unlikely) to have explicit memory of events from the operating room while under general anesthesia.   8. ELECTROCONVULSIVE THERAPY PATIENTS: This consent serve below affirms that prior to the time of the procedure, I have explained to the patient and/or his/her guardian, the risks and benefits of undergoing anesthesia, as well as any reasonable alternatives.     ___________________________________________________

## (undated) NOTE — LETTER
[address]  [facility] [place], [facility], [place]  [address]  INFORMED CONSENT FOR TRANSFUSION OF BLOOD OR BLOOD PRODUCTS  My physician has informed me of the nature, purpose, benefits and risks of transfusion for blood and blood components that ______________________________________________  (Signature of Patient)                                                            (Responsible party in case of Minor,

## (undated) NOTE — LETTER
[address] [place] [address] Rd, [place], [place]     AUTHORIZATION FOR SURGICAL OPERATION OR PROCEDURE    I hereby authorize Dr. Bibiana Barrett Physician(s) and whomever may be designated Physician. 4. I consent to the photographing of procedure(s) to be performed for the purposes of advancing medicine, science and/or education, provided my identity is not revealed.  If the procedure has been videotaped, the physician/surgeon will obtain th (Witness signature)                                                                                                  (Date)                                (Time)  STATEMENT OF PHYSICIAN My signature below affirms that prior to the time of the procedure;  I

## (undated) NOTE — LETTER
[address] [place] [address] Rd, [place], [place]     AUTHORIZATION FOR SURGICAL OPERATION OR PROCEDURE    I hereby authorize Dr. Neftali Paniagua Physician(s) and whomever may be designated Physician. 4. I consent to the photographing of procedure(s) to be performed for the purposes of advancing medicine, science and/or education, provided my identity is not revealed.  If the procedure has been videotaped, the physician/surgeon will obtain th signature)                                                                                                  (Date)                                (Time)  STATEMENT OF PHYSICIAN My signature below affirms that prior to the time of the procedure; I have expl

## (undated) NOTE — LETTER
[address], [place], [place]     AUTHORIZATION FOR SURGICAL OPERATION OR PROCEDURE    I hereby authorize Dr. Adam Abbasi MD, my Physician(s) and whomever may be designated as the doctor's Assistant, to perform the f 4. I consent to the photographing of procedure(s) to be performed for the purposes of advancing medicine, science and/or education, provided my identity is not revealed.  If the procedure has been videotaped, the physician/surgeon will obtain the original v (Witness signature)                                                                                                  (Date)                                (Time)  STATEMENT OF PHYSICIAN My signature below affirms that prior to the time of the procedure;  I